# Patient Record
Sex: MALE | Race: OTHER | HISPANIC OR LATINO | URBAN - METROPOLITAN AREA
[De-identification: names, ages, dates, MRNs, and addresses within clinical notes are randomized per-mention and may not be internally consistent; named-entity substitution may affect disease eponyms.]

---

## 2020-07-06 ENCOUNTER — EMERGENCY (EMERGENCY)
Facility: HOSPITAL | Age: 55
LOS: 1 days | Discharge: HOME | End: 2020-07-06
Attending: EMERGENCY MEDICINE | Admitting: EMERGENCY MEDICINE
Payer: MEDICARE

## 2020-07-06 VITALS
SYSTOLIC BLOOD PRESSURE: 106 MMHG | TEMPERATURE: 98 F | HEART RATE: 79 BPM | RESPIRATION RATE: 18 BRPM | WEIGHT: 199.96 LBS | OXYGEN SATURATION: 98 % | DIASTOLIC BLOOD PRESSURE: 72 MMHG

## 2020-07-06 VITALS
TEMPERATURE: 98 F | RESPIRATION RATE: 18 BRPM | SYSTOLIC BLOOD PRESSURE: 118 MMHG | OXYGEN SATURATION: 100 % | HEART RATE: 78 BPM | DIASTOLIC BLOOD PRESSURE: 71 MMHG

## 2020-07-06 DIAGNOSIS — Z99.2 DEPENDENCE ON RENAL DIALYSIS: ICD-10-CM

## 2020-07-06 DIAGNOSIS — R42 DIZZINESS AND GIDDINESS: ICD-10-CM

## 2020-07-06 DIAGNOSIS — Z95.1 PRESENCE OF AORTOCORONARY BYPASS GRAFT: Chronic | ICD-10-CM

## 2020-07-06 LAB
ALBUMIN SERPL ELPH-MCNC: 3.6 G/DL — SIGNIFICANT CHANGE UP (ref 3.4–5)
ALP SERPL-CCNC: 173 U/L — HIGH (ref 40–120)
ALT FLD-CCNC: 13 U/L — SIGNIFICANT CHANGE UP (ref 12–42)
ANION GAP SERPL CALC-SCNC: 7 MMOL/L — LOW (ref 9–16)
AST SERPL-CCNC: 19 U/L — SIGNIFICANT CHANGE UP (ref 15–37)
BASOPHILS # BLD AUTO: 0.1 K/UL — SIGNIFICANT CHANGE UP (ref 0–0.2)
BASOPHILS NFR BLD AUTO: 0.8 % — SIGNIFICANT CHANGE UP (ref 0–2)
BILIRUB SERPL-MCNC: 0.5 MG/DL — SIGNIFICANT CHANGE UP (ref 0.2–1.2)
BUN SERPL-MCNC: 23 MG/DL — SIGNIFICANT CHANGE UP (ref 7–23)
CALCIUM SERPL-MCNC: 9.8 MG/DL — SIGNIFICANT CHANGE UP (ref 8.5–10.5)
CHLORIDE SERPL-SCNC: 95 MMOL/L — LOW (ref 96–108)
CO2 SERPL-SCNC: 34 MMOL/L — HIGH (ref 22–31)
CREAT SERPL-MCNC: 5.34 MG/DL — HIGH (ref 0.5–1.3)
EOSINOPHIL # BLD AUTO: 0.51 K/UL — HIGH (ref 0–0.5)
EOSINOPHIL NFR BLD AUTO: 4.1 % — SIGNIFICANT CHANGE UP (ref 0–6)
GLUCOSE SERPL-MCNC: 261 MG/DL — HIGH (ref 70–99)
HCT VFR BLD CALC: 36.7 % — LOW (ref 39–50)
HGB BLD-MCNC: 12.5 G/DL — LOW (ref 13–17)
IMM GRANULOCYTES NFR BLD AUTO: 0.7 % — SIGNIFICANT CHANGE UP (ref 0–1.5)
LACTATE SERPL-SCNC: 1.5 MMOL/L — SIGNIFICANT CHANGE UP (ref 0.4–2)
LYMPHOCYTES # BLD AUTO: 1.9 K/UL — SIGNIFICANT CHANGE UP (ref 1–3.3)
LYMPHOCYTES # BLD AUTO: 15.1 % — SIGNIFICANT CHANGE UP (ref 13–44)
MCHC RBC-ENTMCNC: 29.9 PG — SIGNIFICANT CHANGE UP (ref 27–34)
MCHC RBC-ENTMCNC: 34.1 GM/DL — SIGNIFICANT CHANGE UP (ref 32–36)
MCV RBC AUTO: 87.8 FL — SIGNIFICANT CHANGE UP (ref 80–100)
MONOCYTES # BLD AUTO: 0.68 K/UL — SIGNIFICANT CHANGE UP (ref 0–0.9)
MONOCYTES NFR BLD AUTO: 5.4 % — SIGNIFICANT CHANGE UP (ref 2–14)
NEUTROPHILS # BLD AUTO: 9.28 K/UL — HIGH (ref 1.8–7.4)
NEUTROPHILS NFR BLD AUTO: 73.9 % — SIGNIFICANT CHANGE UP (ref 43–77)
NRBC # BLD: 0 /100 WBCS — SIGNIFICANT CHANGE UP (ref 0–0)
PCO2 BLDV: 53 MMHG — HIGH (ref 41–51)
PH BLDV: 7.42 — SIGNIFICANT CHANGE UP (ref 7.32–7.43)
PLATELET # BLD AUTO: 278 K/UL — SIGNIFICANT CHANGE UP (ref 150–400)
PO2 BLDV: 19 MMHG — LOW (ref 35–40)
POTASSIUM SERPL-MCNC: 3.9 MMOL/L — SIGNIFICANT CHANGE UP (ref 3.5–5.3)
POTASSIUM SERPL-SCNC: 3.9 MMOL/L — SIGNIFICANT CHANGE UP (ref 3.5–5.3)
PROT SERPL-MCNC: 9.1 G/DL — HIGH (ref 6.4–8.2)
RBC # BLD: 4.18 M/UL — LOW (ref 4.2–5.8)
RBC # FLD: 14.6 % — HIGH (ref 10.3–14.5)
SAO2 % BLDV: 29 % — SIGNIFICANT CHANGE UP
SODIUM SERPL-SCNC: 136 MMOL/L — SIGNIFICANT CHANGE UP (ref 132–145)
TROPONIN I SERPL-MCNC: <0.017 NG/ML — LOW (ref 0.02–0.06)
WBC # BLD: 12.56 K/UL — HIGH (ref 3.8–10.5)
WBC # FLD AUTO: 12.56 K/UL — HIGH (ref 3.8–10.5)

## 2020-07-06 PROCEDURE — 71045 X-RAY EXAM CHEST 1 VIEW: CPT | Mod: 26

## 2020-07-06 PROCEDURE — 70450 CT HEAD/BRAIN W/O DYE: CPT | Mod: 26

## 2020-07-06 PROCEDURE — 99285 EMERGENCY DEPT VISIT HI MDM: CPT | Mod: 25

## 2020-07-06 PROCEDURE — 93010 ELECTROCARDIOGRAM REPORT: CPT

## 2020-07-06 NOTE — ED PROVIDER NOTE - NS ED ROS FT
Constitutional: no fevers, no chills.  Eyes: no visual changes.  Ears: no ear drainage, no ear pain.  Nose: no nasal congestion.  Mouth/Throat: no sore throat.  Cardiovascular: no chest pain. +syncope  Respiratory: no shortness of breath, no wheezing, no cough  Gastrointestinal: no nausea, no vomiting, no diarrhea, no abdominal pain.  MSK: no flank pain, no back pain.  Genitourinary: no dysuria, no hematuria.  Skin: no rashes.  Neuro: no headache

## 2020-07-06 NOTE — ED ADULT NURSE NOTE - OBJECTIVE STATEMENT
Pt presents to ED with c/o presyncope while at dialysis - apparently had ~ 9 kg of fluid removed, more than usual, felt lightheaded. No focal neuro deficits, trauma, ambulatory, + orthostasis likely 2/2 volume depletion. Pt presents to ED with c/o syncope while at dialysis - apparently had ~ 9 kg of fluid removed, more than usual, felt lightheaded. No focal neuro deficits or signs of trauma, ambulatory, + orthostasis likely 2/2 volume depletion.

## 2020-07-06 NOTE — ED PROVIDER NOTE - PROGRESS NOTE DETAILS
Dr Cortez: CTH negative and CXr clear, labs unremarkable. Dizziness much improved on reassessment, pt able to stand up and walk around the room without dizziness. Called access-a-ride.

## 2020-07-06 NOTE — ED PROVIDER NOTE - NSFOLLOWUPINSTRUCTIONS_ED_ALL_ED_FT
1) Please Follow up with PMD/Clinic within 2-3 days     2) Also follow up with Nephrologist. Eat and drink enough fluids at home.     3) Please take Tylenol 650mg every 4-6 hours as needed for pain.    4) Please return to  Emergency Department for any new or worsening symptoms.

## 2020-07-06 NOTE — ED PROVIDER NOTE - ATTENDING CONTRIBUTION TO CARE
I have personally seen and examined this patient. I have fully participated in the care of this patient. I have reviewed all pertinent clinical information, including history, physical exam and plan.    53 yo male w esrd s/p hd today presents with dizziness typical for him post dialysis     The patient's symptoms progressively improved throughout the ED stay.  ED evaluation and management discussed with the patient and family (if available) in detail.  Close PMD and/or specialist follow up encouraged.  Strict ED return instructions discussed in detail for any worsening or new symptoms. The patient was given the opportunity to ask any questions about their discharge diagnosis and discharge instructions.  The patient verbalized understanding of these instructions and need to return to the ED for any worsening of illness or for any concern. The patient understands the Emergency Department diagnosis is a preliminary diagnosis often based on limited information and that the patient must adhere to the follow-up plan as discussed. The patient tolerated PO fluids.  At the time of discharge from the Emergency Department, the patient is alert with fluent appropriate speech and ambulatory without difficulty.  A medical screening examination was performed and no emergency medical condition was identified.

## 2020-07-06 NOTE — ED ADULT NURSE NOTE - CHPI ED NUR SYMPTOMS NEG
no confusion/no nausea/no blurred vision/no change in level of consciousness/no weakness/no numbness/no vomiting/no loss of consciousness/no fever

## 2020-07-06 NOTE — ED PROVIDER NOTE - PHYSICAL EXAMINATION
GEN: Well appearing, well nourished, in no apparent distress.  HEAD: NCAT  HEENT: PERRL, Airway patent, EOMI, non-erythematous pharynx, no exudates, uvula midline, MMM, neck supple, no LAD, no JVD  LUNG: CTAB, no adventitious sounds, no retractions, no nasal flaring  CV: RRR, no murmurs,   Abd: soft, NTND, no rebound or guarding, BS+ in all quadrants, no CVAT  MSK: WWP, Pulses 2+ in extremities, No edema   Neuro:  AAOx3, Ambulatory with stable gait, SEALS, strength 5/5, sensations intact, cn2-12 intact   Skin: Warm and dry, no evidence of rash, L arm fistula without pus or redness  Psych: normal mood and affect

## 2020-07-06 NOTE — ED PROVIDER NOTE - CLINICAL SUMMARY MEDICAL DECISION MAKING FREE TEXT BOX
54M hx CAD s/p cabg, DM and ESRD BIBEMS for post dialysis syncope today. Pt felt dizzy when he stood up after dialysis and fainted without hitting head. likely syncope from hypovolemia (check orthostatics) but will Plan to: Cardiac Monitor, EKG, Labs/cardiac enzymes, CT head to eval for bleed, CXR. 54M hx CAD s/p cabg, DM and ESRD BIBEMS for post dialysis syncope today. Pt felt dizzy when he stood up after dialysis and fainted without hitting head. 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

## 2020-07-06 NOTE — ED PROVIDER NOTE - OBJECTIVE STATEMENT
54M hx CAD s/p cabg, DM and ESRD BIBEMS for post dialysis syncope today. Pt felt dizzy when he stood up after dialysis and fainted without hitting head. loc for couple seconds. Pt endorses decr PO intake this weekend and this AM. He usually feels dizzy after dialysis but has never had syncopal episode like today. Patient denies chest pain, SOB, f/c, cough, abd pain, N/V/D/C, post episode confusion or focal weakness/tingling, HA.

## 2020-07-06 NOTE — ED ADULT TRIAGE NOTE - CHIEF COMPLAINT QUOTE
completed dialysis, the staff told EMS that they "took more out." He felt dizziness and slid to the floor leaning against the wall at the facility. Denies any injuries. Fistula in the left arm.  hx DM and kidney failure
